# Patient Record
Sex: MALE | Race: WHITE
[De-identification: names, ages, dates, MRNs, and addresses within clinical notes are randomized per-mention and may not be internally consistent; named-entity substitution may affect disease eponyms.]

---

## 2018-03-30 ENCOUNTER — HOSPITAL ENCOUNTER (EMERGENCY)
Dept: HOSPITAL 47 - EC | Age: 28
Discharge: HOME | End: 2018-03-30
Payer: OTHER GOVERNMENT

## 2018-03-30 VITALS — RESPIRATION RATE: 18 BRPM

## 2018-03-30 VITALS — DIASTOLIC BLOOD PRESSURE: 74 MMHG | SYSTOLIC BLOOD PRESSURE: 145 MMHG | TEMPERATURE: 98 F | HEART RATE: 90 BPM

## 2018-03-30 DIAGNOSIS — Z88.2: ICD-10-CM

## 2018-03-30 DIAGNOSIS — Z86.14: ICD-10-CM

## 2018-03-30 DIAGNOSIS — H61.21: Primary | ICD-10-CM

## 2018-03-30 PROCEDURE — 99283 EMERGENCY DEPT VISIT LOW MDM: CPT

## 2018-03-30 PROCEDURE — 69209 REMOVE IMPACTED EAR WAX UNI: CPT

## 2018-03-30 NOTE — ED
ENT HPI





- General


Chief complaint: ENT


Stated complaint: Ear Pain


Time Seen by Provider: 03/30/18 09:37


Source: patient, RN notes reviewed


Mode of arrival: ambulatory


Limitations: no limitations





- History of Present Illness


Initial comments: 





28-year-old male present emergency department to complaint of right ear pain.  

Patient states went to urgent care 2 days ago told he had some wax in his ear 

and that they flush it all out and that it looked slightly agitated 

continuously given some antibiotics.  Patient states that symptoms get worse 

yesterday and today.  Patient reports no fevers no chills denies any neck pain 

or neck stiffness.  He states is a lot of pressure on the right side of his 

ear.  Patient denies any major issues in the past states he was deploy to 

[facility] and states he had some issues over there.  Patient denies any cough

, chest congestion, focal weakness.





- Related Data


 Home Medications











 Medication  Instructions  Recorded  Confirmed


 


Multivitamins, Thera [Multivitamin 1 tab PO DAILY 03/30/18 03/30/18





(formulary)]   


 


Naproxen [Naprosyn] 500 mg PO BID PRN 03/30/18 03/30/18








 Previous Rx's











 Medication  Instructions  Recorded


 


Amoxicillin/Potassium Clav 1 tab PO Q12HR #20 tab 03/30/18





[Augmentin 875-125 Tablet]  


 


Hydrocodone/Acetaminophen [Norco 1 tab PO Q6HR PRN #15 tab 03/30/18





5-325]  


 


Ibuprofen [Motrin] 600 mg PO Q8HR PRN #30 tab 03/30/18











 Allergies











Allergy/AdvReac Type Severity Reaction Status Date / Time


 


sulfamethoxazole Allergy  Rash/Hives Verified 03/30/18 09:36





[From Bactrim]     


 


trimethoprim [From Bactrim] Allergy  Rash/Hives Verified 03/30/18 09:36














Review of Systems


ROS Statement: 


Those systems with pertinent positive or pertinent negative responses have been 

documented in the HPI.





ROS Other: All systems not noted in ROS Statement are negative.





Past Medical History


Past Medical History: No Reported History


History of Any Multi-Drug Resistant Organisms: MRSA


Date of last positivie culture/infection: 2015


MDRO Source:: lower back and left arm


Past Surgical History: No Surgical Hx Reported


Past Psychological History: Anxiety


Smoking Status: Never smoker


Past Alcohol Use History: Occasional


Past Drug Use History: None Reported





General Exam


Limitations: no limitations


General appearance: alert, in no apparent distress


Head exam: Present: atraumatic, normocephalic, normal inspection


Eye exam: Present: normal appearance, PERRL, EOMI.  Absent: scleral icterus, 

conjunctival injection, periorbital swelling


ENT exam: Present: normal oropharynx, mucous membranes moist.  Absent: TM's 

normal bilaterally (Unable to visualize right TM at this time), normal external 

ear exam (Cerumen noted in the right)


Neck exam: Present: normal inspection, full ROM.  Absent: tenderness, 

meningismus, lymphadenopathy


Respiratory exam: Present: normal lung sounds bilaterally.  Absent: respiratory 

distress, wheezes, rales, rhonchi, stridor


Cardiovascular Exam: Present: regular rate, normal rhythm, normal heart sounds.

  Absent: systolic murmur, diastolic murmur, rubs, gallop, clicks





Course


 Vital Signs











  03/30/18





  09:27


 


Temperature 97.3 F L


 


Pulse Rate 95


 


Respiratory 18





Rate 


 


Blood Pressure 153/78


 


O2 Sat by Pulse 97





Oximetry 














Procedures





- Ear Wax Removal


  ** Right Ear


Cerumenolytic Used: 5-10% Sodium Bicarb solution


Ear Canal Irrigated by: RN


Ear Canal Irrigated With: warm saline with H2O2 using syringe/angiocath


Ear Canal: atraumatic


Patient Tolerated Procedure: well


Complications: pain (minimal)





Medical Decision Making





- Medical Decision Making





28-year-old male presents from for right ear pain.  Patient has a cerumen 

impaction did have it irrigated several times with become too painful.  Patient 

does have remaining cerumen.  Patient will follow-up with ENT.  He'll continue 

the rocks.  Patient was switched to Augmentin for concern infection.  Patient 

given pain medication.  Patient agrees to plan.





Disposition


Clinical Impression: 


 Otalgia of right ear, Cerumen impaction





Disposition: HOME SELF-CARE


Condition: Stable


Instructions:  Earache (ED)


Additional Instructions: 


Please return to the Emergency Department if symptoms worsen or any other 

concerns.


Prescriptions: 


Amoxicillin/Potassium Clav [Augmentin 875-125 Tablet] 1 tab PO Q12HR #20 tab


Hydrocodone/Acetaminophen [Norco 5-325] 1 tab PO Q6HR PRN #15 tab


 PRN Reason: Pain


Ibuprofen [Motrin] 600 mg PO Q8HR PRN #30 tab


 PRN Reason: Pain


Referrals: 


[facility] [Primary Care Provider] - 1-2 days


Feliz Dinh MD [STAFF PHYSICIAN] - 1-2 days


Time of Disposition: 11:20